# Patient Record
Sex: FEMALE | Race: WHITE | NOT HISPANIC OR LATINO
[De-identification: names, ages, dates, MRNs, and addresses within clinical notes are randomized per-mention and may not be internally consistent; named-entity substitution may affect disease eponyms.]

---

## 2019-04-09 ENCOUNTER — RECORD ABSTRACTING (OUTPATIENT)
Age: 49
End: 2019-04-09

## 2019-04-09 VITALS — HEIGHT: 66 IN

## 2019-04-09 DIAGNOSIS — Z87.19 PERSONAL HISTORY OF OTHER DISEASES OF THE DIGESTIVE SYSTEM: ICD-10-CM

## 2019-04-09 DIAGNOSIS — Z78.9 OTHER SPECIFIED HEALTH STATUS: ICD-10-CM

## 2019-04-09 PROBLEM — Z00.00 ENCOUNTER FOR PREVENTIVE HEALTH EXAMINATION: Status: ACTIVE | Noted: 2019-04-09

## 2019-04-10 ENCOUNTER — APPOINTMENT (OUTPATIENT)
Dept: SURGERY | Facility: CLINIC | Age: 49
End: 2019-04-10
Payer: MEDICAID

## 2019-04-10 VITALS
DIASTOLIC BLOOD PRESSURE: 98 MMHG | HEART RATE: 100 BPM | SYSTOLIC BLOOD PRESSURE: 137 MMHG | TEMPERATURE: 98.9 F | HEIGHT: 66 IN

## 2019-04-10 DIAGNOSIS — Z87.42 PERSONAL HISTORY OF OTHER DISEASES OF THE FEMALE GENITAL TRACT: ICD-10-CM

## 2019-04-10 DIAGNOSIS — M50.90 CERVICAL DISC DISORDER, UNSPECIFIED, UNSPECIFIED CERVICAL REGION: ICD-10-CM

## 2019-04-10 DIAGNOSIS — Z87.19 PERSONAL HISTORY OF OTHER DISEASES OF THE DIGESTIVE SYSTEM: ICD-10-CM

## 2019-04-10 DIAGNOSIS — Z87.39 PERSONAL HISTORY OF OTHER DISEASES OF THE MUSCULOSKELETAL SYSTEM AND CONNECTIVE TISSUE: ICD-10-CM

## 2019-04-10 DIAGNOSIS — E66.01 MORBID (SEVERE) OBESITY DUE TO EXCESS CALORIES: ICD-10-CM

## 2019-04-10 DIAGNOSIS — Q43.3 CONGENITAL MALFORMATIONS OF INTESTINAL FIXATION: ICD-10-CM

## 2019-04-10 PROCEDURE — 99204 OFFICE O/P NEW MOD 45 MIN: CPT

## 2019-04-10 RX ORDER — ALBUTEROL SULFATE 90 UG/1
108 (90 BASE) AEROSOL, METERED RESPIRATORY (INHALATION)
Qty: 9 | Refills: 0 | Status: ACTIVE | COMMUNITY
Start: 2018-10-09

## 2019-04-10 NOTE — PLAN
[FreeTextEntry1] : To confer with the [facility] bariatric PA/Coordinator regarding weight loss options. \par Await further decision regarding gyn diagnosis and treatment options\par To consider abdominal binder thst would be comfortable for her to wear (has tried many types without success). \par F/u regarding hernia repair and timing after above have been completed.

## 2019-04-10 NOTE — HISTORY OF PRESENT ILLNESS
[de-identified] : The patient presents with complaints of ventral hernia pain and other symptoms. She is referred by Dr Cosme Mendosa .\par She is s/p a left colon resection with anterior resection, mobilization of splenic flexure, appendectomy, drainage of left ovarian cyst for diverticulitis on October 30, 2017.  She was also noted to have malrotation of the colon.  Pathology revealed perforated diverticulitis of the sigmoid and at the splenic flexure with extensive serositis of the resected colon and a benign appendix with serositis.   She states that the bulge is increasing in size and she frequently has to push it back in.  She is also complaining of bilateral groin pain.  CT done in December 2017 revealed bilateral fat containing inguinal hernias.  MRI done at [facility] sometime in 2018  shows an incisional hernia above the umbilicus as well as a small hernia at the umbilicus in addition to the previously noted bilateral inguinal hernias\par She also c/o:\par vaginal bleeding, heavy periods, bilateral lower leg weakness, weight gain, nausea, fatigue. \par She is undergoing a workup by Dr Curiel for consideration of a TAHBSO. \par She has seen three different general surgeons for the hernia repair and was "dismissed" by them as per her description of the encounters. She was told the hernias cannot be done robotically. \par She expects that repair of her ventral hernia would correct most of her symptoms and hopes this can be done in time to begin a new job in May. She is unsure of the decision regarding the hysterectomy.

## 2019-04-10 NOTE — ASSESSMENT
[FreeTextEntry1] : multiple symptoms, multiple problems, multiple medical opinions\par specific to general surgery-- has a large ventral hernia , bilateral inguinal hernias, umbilical hernia (and told of a hiatal hernia). \par She is a high risk for recurrent ventral hernia repair with her current weight of 248lbs (BMI 40). \par Method of repair could start laparoscopically (possibly robotic assist) but there is a high likelihood of conversion to open given her past surgical history. \par I would recommend weight loss by diets or even bariatric surgery (eval) prior to hernia repair. However, if her gyn workup requires more urgent surgical intervention (TAHBSO), then a meshed repair could be performed at the same time. However this would not be the ideal setting. \par The inguinal hernias can also be addressed at the time of the ventral hernia repair (which is her main symptom complaint)\par

## 2019-04-10 NOTE — PHYSICAL EXAM
[JVD] : no jugular venous distention  [Normal Breath Sounds] : Normal breath sounds [Normal Heart Sounds] : normal heart sounds [Agitated] : agitated [de-identified] : NAD, obese [de-identified] : large ventral hernia, midline incision, central obesity, non palpable bilateral inguinal hernias,

## 2019-04-10 NOTE — REVIEW OF SYSTEMS
[Feeling Poorly] : feeling poorly [Recent Weight Gain (___ Lbs)] : recent [unfilled] ~Ulb weight gain [Feeling Tired] : feeling tired [Shortness Of Breath] : shortness of breath [Wheezing] : no wheezing [SOB on Exertion] : shortness of breath during exertion [Cough] : no cough [Orthopnea] : no orthopnea [PND] : no PND [Abdominal Pain] : abdominal pain [Heartburn] : heartburn [Dysuria] : no dysuria [Joint Pain] : joint pain [Limb Pain] : limb pain [Joint Swelling] : joint swelling [Negative] : Cardiovascular [Limb Weakness] : limb weakness

## 2020-04-30 ENCOUNTER — APPOINTMENT (OUTPATIENT)
Dept: SURGERY | Facility: CLINIC | Age: 50
End: 2020-04-30
Payer: MEDICAID

## 2020-04-30 PROCEDURE — 99213 OFFICE O/P EST LOW 20 MIN: CPT

## 2020-04-30 NOTE — PLAN
[FreeTextEntry1] : I would recommend an attempted laparoscopic (robotic) ventral and bilateral inguinal hernia repair simultaneously with any GYN procedure planned. A conversion to an open procedure is highly likely given her previous abd surgical history and chance for significant adhesions. She is aware of this. The risks benefits and alternatives were discussed and the patient agrees to the described plan.\par \par Since this is not an emergency, we will await opening of elective procedures in  [facility] which have been placed on hold due to Covid-19 pandemia. Perhaps July 2020 this  can be scheduled. in the meantime she will see \par Amparo Wood/Angeli and monitor her symptoms. She knows what signs and symptoms to look for that would deem this an emergency and knows to go to the ED if these occur. \par \par F/u in mid June ( telemedicine visit may be appropriate)

## 2020-04-30 NOTE — PHYSICAL EXAM
[Normal Breath Sounds] : Normal breath sounds [Normal Heart Sounds] : normal heart sounds [Abdominal Masses] : No abdominal masses [Abdomen Tenderness] : ~T ~M No abdominal tenderness [No Rash or Lesion] : No rash or lesion [Alert] : alert [Oriented to Person] : oriented to person [Anxious] : anxious [Oriented to Time] : oriented to time [Oriented to Place] : oriented to place [de-identified] : NAD [de-identified] : EOMI [de-identified] : protruding. large upper midline ventral hernia, reducible

## 2020-04-30 NOTE — HISTORY OF PRESENT ILLNESS
[de-identified] : Pt has gone one year with increasing symptoms including:\par abd bloating, nausea,urinary incontinence,abd pains, loose stool, increased asthma symptoms\par \par She has a known:\par ventral (upper midline incisional) hernia,\par bilateral fat containing inguinal hernias\par hiatal hernias.\par \par she had vaginal bleeding that has been controlled by placement of an IUD last year. 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

## 2020-04-30 NOTE — ASSESSMENT
[FreeTextEntry1] : enlarging ventral hernia, symptomatic\par also now with increased stress urinary incontinence, \par will ask her to see Dr Curiel for determination if cystocele should be treated surgically (TAHBSO?, sling?) perhaps at same time of hernia repairs. \par she has seen Dr Talbot as well whom she will also make a f/u appt after seeing Dr Curiel. \par \par \par

## 2020-04-30 NOTE — REVIEW OF SYSTEMS
[As Noted in HPI] : as noted in HPI [Feeling Tired] : feeling tired [Shortness Of Breath] : no shortness of breath [Wheezing] : wheezing [SOB on Exertion] : shortness of breath during exertion [Diarrhea] : diarrhea [Orthopnea] : no orthopnea [Incontinence] : incontinence [Pelvic Pain] : no pelvic pain [Dysmenorrhea] : no dysmenorrhea [Abn Vaginal Bleeding] : no unexplained vaginal bleeding [Vaginal Discharge] : no vaginal discharge [Joint Pain] : joint pain

## 2020-04-30 NOTE — CONSULT LETTER
[Dear  ___] : Dear  [unfilled], [Referral Letter:] : I am referring [unfilled] to you for further evaluation.  My most recent evaluation follows. [FreeTextEntry1] : Jg Eagleie is going to see you again since she is having stress incontinence of urine which may be due to a cystocele. She will need a ventral/ inguinal hernia repair (lap robot possibly) and perhaps can be done simultaneously with any GYN procedure you deem appropriate. Thanks-Khurram

## 2020-10-22 ENCOUNTER — APPOINTMENT (OUTPATIENT)
Dept: SURGERY | Facility: CLINIC | Age: 50
End: 2020-10-22
Payer: MEDICAID

## 2020-10-22 ENCOUNTER — APPOINTMENT (OUTPATIENT)
Dept: UROLOGY | Facility: CLINIC | Age: 50
End: 2020-10-22
Payer: MEDICAID

## 2020-10-22 VITALS
WEIGHT: 231 LBS | DIASTOLIC BLOOD PRESSURE: 89 MMHG | TEMPERATURE: 98.7 F | BODY MASS INDEX: 37.12 KG/M2 | HEART RATE: 67 BPM | HEIGHT: 66 IN | SYSTOLIC BLOOD PRESSURE: 131 MMHG

## 2020-10-22 VITALS
SYSTOLIC BLOOD PRESSURE: 128 MMHG | HEIGHT: 66 IN | DIASTOLIC BLOOD PRESSURE: 84 MMHG | BODY MASS INDEX: 37.12 KG/M2 | HEART RATE: 77 BPM | WEIGHT: 231 LBS

## 2020-10-22 DIAGNOSIS — Z87.440 PERSONAL HISTORY OF URINARY (TRACT) INFECTIONS: ICD-10-CM

## 2020-10-22 DIAGNOSIS — R10.9 UNSPECIFIED ABDOMINAL PAIN: ICD-10-CM

## 2020-10-22 PROCEDURE — 99072 ADDL SUPL MATRL&STAF TM PHE: CPT

## 2020-10-22 PROCEDURE — 99204 OFFICE O/P NEW MOD 45 MIN: CPT

## 2020-10-22 PROCEDURE — 99214 OFFICE O/P EST MOD 30 MIN: CPT

## 2020-10-22 NOTE — HISTORY OF PRESENT ILLNESS
[de-identified] : The patient presents for f/u regarding multiple ventral hernias. \par She had a recent ED visit on 10/11/2020 due to an episode of severe left flank pain. 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 However her ventral , umbilical and inguinal hernias are still present and slightly larger when compare to previous studies. She was seen in April and hernia repair recommended but was delayed due to Covid-19 pandemic\par  and she has also been hesitant to have surgery. Currently she has no GI issues .

## 2020-10-22 NOTE — PLAN
[FreeTextEntry1] : To see Dr Suh (Uro) to r/o stones and follow UPJ. To see Dr Curiel as f/u due to previous irreg vag bleeding. To consider surgery in December due to job restrictions now.

## 2020-10-22 NOTE — REVIEW OF SYSTEMS
[Recent Weight Gain (___ Lbs)] : recent [unfilled] ~Ulb weight gain [Incontinence] : incontinence [Pelvic Pain] : pelvic pain [Negative] : Gastrointestinal

## 2020-10-22 NOTE — PHYSICAL EXAM
[Normal Breath Sounds] : Normal breath sounds [Normal Heart Sounds] : normal heart sounds [Abdominal Masses] : No abdominal masses [No Rash or Lesion] : No rash or lesion [Alert] : alert [Oriented to Person] : oriented to person [Oriented to Place] : oriented to place [Oriented to Time] : oriented to time [Calm] : calm [de-identified] : NAD [de-identified] : olgaltip[le ventral hernias, reducible ,nontender, bilat inguina. hernias, obese abd

## 2020-10-22 NOTE — ASSESSMENT
[FreeTextEntry1] : I am seeing this 49-year-old patient at the request of Dr. 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

## 2020-10-23 LAB
APPEARANCE: CLEAR
BACTERIA: NEGATIVE
BILIRUBIN URINE: NEGATIVE
BLOOD URINE: NEGATIVE
COLOR: NORMAL
GLUCOSE QUALITATIVE U: NEGATIVE
HYALINE CASTS: 0 /LPF
KETONES URINE: NEGATIVE
LEUKOCYTE ESTERASE URINE: NEGATIVE
MICROSCOPIC-UA: NORMAL
NITRITE URINE: NEGATIVE
PH URINE: 6
PROTEIN URINE: NEGATIVE
RED BLOOD CELLS URINE: 1 /HPF
SPECIFIC GRAVITY URINE: 1.02
SQUAMOUS EPITHELIAL CELLS: 2 /HPF
UROBILINOGEN URINE: NORMAL
WHITE BLOOD CELLS URINE: 1 /HPF

## 2020-11-10 ENCOUNTER — RESULT REVIEW (OUTPATIENT)
Age: 50
End: 2020-11-10

## 2020-12-23 PROBLEM — Z87.440 HISTORY OF URINARY TRACT INFECTION: Status: RESOLVED | Noted: 2020-10-22 | Resolved: 2020-12-23

## 2021-01-04 ENCOUNTER — APPOINTMENT (OUTPATIENT)
Dept: SURGERY | Facility: HOSPITAL | Age: 51
End: 2021-01-04

## 2021-01-06 ENCOUNTER — APPOINTMENT (OUTPATIENT)
Dept: SURGERY | Facility: CLINIC | Age: 51
End: 2021-01-06
Payer: COMMERCIAL

## 2021-01-06 VITALS
SYSTOLIC BLOOD PRESSURE: 140 MMHG | DIASTOLIC BLOOD PRESSURE: 83 MMHG | WEIGHT: 231 LBS | HEIGHT: 66 IN | HEART RATE: 77 BPM | BODY MASS INDEX: 37.12 KG/M2 | TEMPERATURE: 97.6 F

## 2021-01-06 DIAGNOSIS — K43.9 VENTRAL HERNIA W/OUT OBSTRUCTION OR GANGRENE: ICD-10-CM

## 2021-01-06 DIAGNOSIS — K42.9 UMBILICAL HERNIA W/OUT OBSTRUCTION OR GANGRENE: ICD-10-CM

## 2021-01-06 DIAGNOSIS — K40.90 UNILATERAL INGUINAL HERNIA, W/OUT OBSTRUCTION OR GANGRENE, NOT SPECIFIED AS RECURRENT: ICD-10-CM

## 2021-01-06 PROCEDURE — 99215 OFFICE O/P EST HI 40 MIN: CPT

## 2021-01-06 PROCEDURE — 99072 ADDL SUPL MATRL&STAF TM PHE: CPT

## 2021-01-06 NOTE — HISTORY OF PRESENT ILLNESS
[de-identified] : Pt returns for further discussion regarding potential ventral hernia repair. I had a long discussion with her and her  detailing the apporach, potential problems, risks and benefits. I suggested a robotically assisted apporach due to the  umber and size of her multiple ventral hernias.. I am quite aware of her previous surgicval hoistory including the two partial colectopmies and her malroatation. I am aware , as she is, that there may be too many adhesions to be able to safely coimplete the laparoscopic apporach and that an open ventral hernia repair would be performed at the time of the surgery. We discussed mesh and its risks (bowel injury, erosion, infection, ) and benefits, (lower recurrence rate, better repair).\par She is aware that her multitude of complaints and symptoms, shortness of breath when exerting herself, reflux, diarrhea, may be come worse with the hernia repair and that weight loss would significantly help with the shortness of breath and reflux. She is also aware that she should avoid refluxive foods (caffeine, red wine) which she still takes on occasion which exacerbates her symptoms. \par She is also aware that the bulge may not completely flatten out after the surgery due to the inability to completely re-approximate the midline with nothing other than mesh. She is also aware of the potential for bowel injury, recognized and unrecognized, and the very low possibility of a diverting procedure (which would not likely be permanent unless there is future contraindication to surgery).  [de-identified] : Previous visit on 4/10/2019:\par \par \par The patient presents with complaints of ventral hernia pain and other symptoms. She is referred by Dr Cosme Mendosa.\par She is s/p a left colon resection with anterior resection, mobilization of splenic flexure, appendectomy, drainage of left ovarian cyst for diverticulitis on October 30, 2017. She was also noted to have malrotation of the colon. Pathology revealed perforated diverticulitis of the sigmoid and at the splenic flexure with extensive serositis of the resected colon and a benign appendix with serositis. She states that the bulge is increasing in size and she frequently has to push it back in. She is also complaining of bilateral groin pain. CT done in December 2017 revealed bilateral fat containing inguinal hernias. MRI done at [facility] sometime in 2018 shows an incisional hernia above the umbilicus as well as a small hernia at the umbilicus in addition to the previously noted bilateral inguinal hernias\par She also c/o:\par vaginal bleeding, heavy periods, bilateral lower leg weakness, weight gain, nausea, fatigue. \par She is undergoing a workup by Dr Curiel for consideration of a TAHBSO. \par She has seen three different general surgeons for the hernia repair and was "dismissed" by them as per her description of the encounters. She was told the hernias cannot be done robotically. \par She expects that repair of her ventral hernia would correct most of her symptoms and hopes this can be done in time to begin a new job in May. She is unsure of the decision regarding the hysterectomy\par Assessment\par Ventral hernia without obstruction or gangrene (553.20) (K43.9)\par Umbilical hernia without obstruction and without gangrene (553.1) (K42.9)\par Inguinal hernia, right (550.90) (K40.90)\par Inguinal hernia, left (550.90) (K40.90)\par \par multiple symptoms, multiple problems, multiple medical opinions\par specific to general surgery-- has a large ventral hernia , bilateral inguinal hernias, umbilical hernia (and told of a hiatal hernia). \par She is a high risk for recurrent ventral hernia repair with her current weight of 248lbs (BMI 40). \par Method of repair could start laparoscopically (possibly robotic assist) but there is a high likelihood of conversion to open given her past surgical history. \par I would recommend weight loss by diets or even bariatric surgery (eval) prior to hernia repair. However, if her gyn workup requires more urgent surgical intervention (TAHBSO), then a meshed repair could be performed at the same time. However this would not be the ideal setting. \par The inguinal hernias can also be addressed at the time of the ventral hernia repair (which is her main symptom complaint)

## 2021-01-06 NOTE — REVIEW OF SYSTEMS
[Feeling Tired] : feeling tired [SOB on Exertion] : shortness of breath during exertion [Abdominal Pain] : abdominal pain [Diarrhea] : diarrhea [Heartburn] : heartburn [Dysuria] : dysuria [Incontinence] : incontinence [Anxiety] : anxiety [Negative] : Cardiovascular [Fever] : no fever [Chills] : no chills [Vomiting] : no vomiting [Constipation] : no constipation [Melena] : no melena

## 2021-01-06 NOTE — PHYSICAL EXAM
[Normal Breath Sounds] : Normal breath sounds [Normal Heart Sounds] : normal heart sounds [No Rash or Lesion] : No rash or lesion [Alert] : alert [Oriented to Person] : oriented to person [Oriented to Place] : oriented to place [Oriented to Time] : oriented to time [Anxious] : anxious [Abdomen Tenderness] : ~T ~M No abdominal tenderness [de-identified] : NAD [de-identified] : upper midline ventral hernia, smaller periumbilical hernia, unable to feel bilateral inguinal hernias which were noted on previous CT scan

## 2021-01-11 ENCOUNTER — APPOINTMENT (OUTPATIENT)
Dept: SURGERY | Facility: HOSPITAL | Age: 51
End: 2021-01-11

## 2021-01-26 ENCOUNTER — APPOINTMENT (OUTPATIENT)
Dept: SURGERY | Facility: CLINIC | Age: 51
End: 2021-01-26
Payer: COMMERCIAL

## 2021-01-26 PROCEDURE — 99024 POSTOP FOLLOW-UP VISIT: CPT

## 2021-01-26 NOTE — ASSESSMENT
[FreeTextEntry1] : Patient here for post op check . Doing very well. No complaints. Wounds clean and dry. Tolerating po and no GI complaints. No heavy lifting for 6 more weeks (total of 8). To remain out of work (construction). To follow up with me at the end of February to reassess return to work. .\par

## 2021-02-25 ENCOUNTER — APPOINTMENT (OUTPATIENT)
Dept: SURGERY | Facility: CLINIC | Age: 51
End: 2021-02-25
Payer: COMMERCIAL

## 2021-02-25 VITALS
WEIGHT: 236 LBS | HEIGHT: 66 IN | SYSTOLIC BLOOD PRESSURE: 125 MMHG | DIASTOLIC BLOOD PRESSURE: 88 MMHG | TEMPERATURE: 97.6 F | HEART RATE: 105 BPM | BODY MASS INDEX: 37.93 KG/M2

## 2021-02-25 DIAGNOSIS — Z09 ENCOUNTER FOR FOLLOW-UP EXAMINATION AFTER COMPLETED TREATMENT FOR CONDITIONS OTHER THAN MALIGNANT NEOPLASM: ICD-10-CM

## 2021-02-25 PROCEDURE — 99024 POSTOP FOLLOW-UP VISIT: CPT

## 2021-02-25 NOTE — HISTORY OF PRESENT ILLNESS
[de-identified] : The pat returns now 6 weeks post robotic ventral hernia repair. She is doing well. Wearing her abdominal support and feels some pains as expected but she is very happy woith the results. There is mild bloating but no bulges or recurrence of hernias noted. All wounds are clean and healed.

## 2021-02-25 NOTE — ASSESSMENT
[FreeTextEntry1] : doing well, to follow up PRN, can go back to work Monday 2/28/21 for 2 weeks of light duty (no lifting of more than 10lbs) and to continue to wear abdominal support for at least 2 more months, especially at work.

## 2022-11-08 ENCOUNTER — APPOINTMENT (OUTPATIENT)
Dept: SURGERY | Facility: CLINIC | Age: 52
End: 2022-11-08

## 2022-11-08 VITALS
SYSTOLIC BLOOD PRESSURE: 180 MMHG | DIASTOLIC BLOOD PRESSURE: 94 MMHG | HEIGHT: 66 IN | BODY MASS INDEX: 36.96 KG/M2 | TEMPERATURE: 97.6 F | HEART RATE: 67 BPM | WEIGHT: 230 LBS

## 2022-11-08 DIAGNOSIS — R10.10 UPPER ABDOMINAL PAIN, UNSPECIFIED: ICD-10-CM

## 2022-11-08 PROCEDURE — 99213 OFFICE O/P EST LOW 20 MIN: CPT

## 2022-11-08 NOTE — HISTORY OF PRESENT ILLNESS
[de-identified] : The patient returns for re-evaluation of her abdominal wall which she feels is uncomfortable with a pain at the left lateral site of the previously repaired ventral hernias. she is s/p a lap converted to open ventral hernia repair with mesh on 1/11/2021. She had recovered well but now is experiencing some pain on the left side of the abd along the midclavicular line at the level just above the umbilicus. No bulge present. Tender to touch. She is active at work. \par She also has been noticing gas pains, gaseous bloating , loose stool for a while. Has seen Dr Sam in the past.

## 2022-11-08 NOTE — ASSESSMENT
[FreeTextEntry1] : no obvious hernia palpated but she is tender in a localized spot on the left lateral abd wall. New or Recurrent hernia? muscle strain? suture tear?

## 2022-11-17 ENCOUNTER — NON-APPOINTMENT (OUTPATIENT)
Age: 52
End: 2022-11-17

## 2024-08-14 ENCOUNTER — NON-APPOINTMENT (OUTPATIENT)
Age: 54
End: 2024-08-14